# Patient Record
Sex: MALE | Race: WHITE | NOT HISPANIC OR LATINO | Employment: FULL TIME | URBAN - METROPOLITAN AREA
[De-identification: names, ages, dates, MRNs, and addresses within clinical notes are randomized per-mention and may not be internally consistent; named-entity substitution may affect disease eponyms.]

---

## 2023-11-08 ENCOUNTER — APPOINTMENT (OUTPATIENT)
Dept: RADIOLOGY | Facility: HOSPITAL | Age: 28
End: 2023-11-08
Payer: COMMERCIAL

## 2023-11-08 ENCOUNTER — HOSPITAL ENCOUNTER (EMERGENCY)
Facility: HOSPITAL | Age: 28
Discharge: HOME | End: 2023-11-08
Payer: COMMERCIAL

## 2023-11-08 VITALS
TEMPERATURE: 97.9 F | DIASTOLIC BLOOD PRESSURE: 68 MMHG | OXYGEN SATURATION: 100 % | RESPIRATION RATE: 16 BRPM | HEART RATE: 81 BPM | SYSTOLIC BLOOD PRESSURE: 120 MMHG

## 2023-11-08 DIAGNOSIS — S89.92XA INJURY OF LEFT KNEE, INITIAL ENCOUNTER: Primary | ICD-10-CM

## 2023-11-08 PROCEDURE — 73560 X-RAY EXAM OF KNEE 1 OR 2: CPT | Mod: LT

## 2023-11-08 PROCEDURE — 99285 EMERGENCY DEPT VISIT HI MDM: CPT | Mod: 25

## 2023-11-08 PROCEDURE — 73560 X-RAY EXAM OF KNEE 1 OR 2: CPT | Mod: LEFT SIDE | Performed by: RADIOLOGY

## 2023-11-08 ASSESSMENT — LIFESTYLE VARIABLES
HAVE PEOPLE ANNOYED YOU BY CRITICIZING YOUR DRINKING: NO
HAVE YOU EVER FELT YOU SHOULD CUT DOWN ON YOUR DRINKING: NO
REASON UNABLE TO ASSESS: NO
EVER HAD A DRINK FIRST THING IN THE MORNING TO STEADY YOUR NERVES TO GET RID OF A HANGOVER: NO
EVER FELT BAD OR GUILTY ABOUT YOUR DRINKING: NO

## 2023-11-08 NOTE — DISCHARGE INSTRUCTIONS
Follow-up with orthopedics due to your left knee injury.  You were placed in a knee immobilizer to stabilize her knee.  Elevate, ice, and rest.  Take ibuprofen for pain control.  Return to ER if symptoms change or worsen.

## 2023-11-08 NOTE — ED PROVIDER NOTES
Limitations to History: none  External Records Reviewed  Independent Historians: none  Social determinants affecting care: none    HPI  Josias Esteves is a 28 y.o. male who presents emergency department due to left knee injury.  He reports that he responded to a call and he had to lift patient off of the toilet and the patient landed directly on his left knee.  He is reporting left medial knee pain and swelling.  He denies loss of function of the knee, numbness, tingling, or any wounds.  He has not tried anything for pain quite yet.  He denies sustaining any other injuries.  He has no further complaints.    [facility]  Past Medical History:   Diagnosis Date    Personal history of other (healed) physical injury and trauma     History of sprain of ankle    Personal history of traumatic brain injury     History of concussion    Unspecified fracture of left foot, initial encounter for closed fracture     Fracture of left foot    reviewed by myself.    Meds  No current outpatient medications    Allergies  Not on File reviewed by myself.    SHx    reviewed by myself.      ------------------------------------------------------------------------------------------------------------------------------------------    There were no vitals taken for this visit.    Physical Exam  Vitals and nursing note reviewed.   Constitutional:       Appearance: Normal appearance. He is normal weight.   HENT:      Head: Normocephalic.      Nose: Nose normal.      Mouth/Throat:      Mouth: Mucous membranes are moist.      Pharynx: Oropharynx is clear.   Eyes:      Extraocular Movements: Extraocular movements intact.      Conjunctiva/sclera: Conjunctivae normal.   Cardiovascular:      Rate and Rhythm: Normal rate and regular rhythm.   Pulmonary:      Effort: Pulmonary effort is normal.      Breath sounds: Normal breath sounds.   Musculoskeletal:         General: Normal range of motion.      Cervical back: Neck supple.      Left hip: Normal. No tenderness  or bony tenderness. Normal range of motion.      Left knee: Swelling present. No erythema, ecchymosis, lacerations or bony tenderness. Normal range of motion. Tenderness present over the medial joint line. Normal pulse.      Left foot: Normal. Normal capillary refill. Normal pulse.   Skin:     General: Skin is warm and dry.   Neurological:      Mental Status: He is alert and oriented to person, place, and time.   Psychiatric:         Attention and Perception: Attention normal.         Mood and Affect: Mood normal.          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        Mild soft tissue stranding.        MACRO:   None        Signed by: Izzy Knott 11/8/2023 2:58 AM   Dictation workstation:   [id number]           Labs  Labs Reviewed - No data to display     ED Course  Diagnoses as of 11/08/23 0328   Injury of left knee, initial encounter        Medical Decision Making: He did not appear ill or toxic.  Vital signs reviewed and stable.  X-ray was obtained.    Differential diagnoses considered: Contusion, sprain, fracture, ligamentous injury, others    Medications given: Offered and declined    X-ray showing no acute fracture.  There is a bipartite patella with soft tissue stranding.  Discussed with the patient about the x-ray imaging.  He was placed in a knee immobilizer.  I recommend he follow-up with orthopedics due to the knee injury.  He was instructed to rest, ice, elevate his knee due to injury.  He is to take NSAIDs for pain control.  He is to return to ER symptoms change or worsen.  He verbalized understanding and agreed to plan of care.  He was discharged home in stable condition.    Diagnosis: Left knee injury  Plan: Discharge           Sathish Reddy PA-C  11/08/23 0328

## 2023-12-04 ENCOUNTER — EVALUATION (OUTPATIENT)
Dept: PHYSICAL THERAPY | Facility: CLINIC | Age: 28
End: 2023-12-04
Payer: COMMERCIAL

## 2023-12-04 DIAGNOSIS — S83.92XA SPRAIN OF UNSPECIFIED SITE OF LEFT KNEE, INITIAL ENCOUNTER: ICD-10-CM

## 2023-12-04 PROCEDURE — 97161 PT EVAL LOW COMPLEX 20 MIN: CPT | Mod: GP | Performed by: PHYSICAL THERAPIST

## 2023-12-04 PROCEDURE — 97110 THERAPEUTIC EXERCISES: CPT | Mod: GP | Performed by: PHYSICAL THERAPIST

## 2023-12-04 PROCEDURE — 97016 VASOPNEUMATIC DEVICE THERAPY: CPT | Mod: GP | Performed by: PHYSICAL THERAPIST

## 2023-12-04 ASSESSMENT — PATIENT HEALTH QUESTIONNAIRE - PHQ9
1. LITTLE INTEREST OR PLEASURE IN DOING THINGS: NOT AT ALL
2. FEELING DOWN, DEPRESSED OR HOPELESS: NOT AT ALL
SUM OF ALL RESPONSES TO PHQ9 QUESTIONS 1 AND 2: 0

## 2023-12-04 ASSESSMENT — COLUMBIA-SUICIDE SEVERITY RATING SCALE - C-SSRS
1. IN THE PAST MONTH, HAVE YOU WISHED YOU WERE DEAD OR WISHED YOU COULD GO TO SLEEP AND NOT WAKE UP?: NO
6. HAVE YOU EVER DONE ANYTHING, STARTED TO DO ANYTHING, OR PREPARED TO DO ANYTHING TO END YOUR LIFE?: NO
2. HAVE YOU ACTUALLY HAD ANY THOUGHTS OF KILLING YOURSELF?: NO

## 2023-12-04 NOTE — PROGRESS NOTES
Physical Therapy    Physical Therapy Evaluation    Patient Name: Josias Esteves  MRN: [id number]  Today's Date: 12/04/23  Time Calculation  Start Time: 0515  Stop Time: 0615  Time Calculation (min): 60 min     Insurance:  Visit number: 1 of 12  Insurance Type: Payor: [employer] / Plan: [employer] / Product Type: *No Product type* /   Authorization or Plan of Care date Range: LEFT KNEE SPRAIN - C9 12V 11/10-12/29/23 DIAG S83.92XA     Therapy diagnoses:   1. Sprain of unspecified site of left knee, initial encounter  Referral to Physical Therapy    Follow Up In Physical Therapy           General:  Reason for visit: Left knee injury 11/8/23.  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   Referred by: Clarence ACOSTA in [facility] MD appt:  12/5/23     Precautions:  none  Fall Risk: Low     Assessment:   Left knee injury 11/8/23.  Likely MCL strain.  Not feeling any improvement over past 4 weeks.    Impairments: Pain, Active ROM, Passive ROM, Strength, Activity limitations, Flexibility, Edema, Joint mobility, Decreased functional level, and Participation restrictions    Functional Limitations: Walking, Stair negotiation, Working, Sport, and Standing    Recommended Treatment:  Therapeutic exercise, Manual therapy, Gait training, Home program instruction and progression, Neuromuscular re-education, Therapeutic activities, Self care and home management, Instruction in activity modification, Electrical stimulation, and Vasopneumatic device + cold      Plan:  Plan of care was developed with input and agreement by the patient.  2 x 6    Rehab Potential: Excellent to achieve goals.    Goals:  Short Term Goals:   - Patient to be Indep in [facility] for self management of symptoms    - Patient to report abolished pain in the Left knee.    Long Term Goals:   - LEFS: 80/80 to indicate a significant improvement in overall function.      - Patient will demo 5/5  hip strength (all planes) to allow for correct gait and stair mechanics     - Patient will demo mild to no limitation AROM left knee to allow for correct mechanics with functional mobility.    - Patient to demonstrate gait with least restrictive device for all ADLS and does not demonstrate significant deviations that may contribute to discomfort in the future    - Patient to negotiate stairs reciprocally and descend with near equal eccentric control without use of hand rail.     - Return to full time duty as a       Subjective:  - CC:  Left knee injury.  Current functional limitations: knee josr on stairs, intermittent pain medial > lateral knee.  Occasional catching.  Reports swelling medial aspect at time of injury.  - ARISTEO:  Large man essentially fell on his Left leg.    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      Mild soft tissue stranding.  - PLOF: Indep and pain free.  - FUNCTIONAL LIMITATIONS: Walking with intermittent limp.   - WORK: Light duty.  [employer] .  - EXERCISE:Works out regularly.  Lighter weight, higher reps.     Medical History Form: Reviewed (scanned into chart)       Objective:   OBJECTIVE:    -GAIT: Independent. no device Mildly antalgic  -STAIRS:   Ascends and descends stairs Independently.    -SENSATION: intact    PALPATION:   Medial joint line R/L: negative / positive  :     -RANGE OF MOTION:  Knee ROM: full, pain with terminal flexion, and pain with terminal extension  Patellar Mobility WNL     STRENGTH    Manual Muscle Test Knee: Knee Flexion R/L: 5 / 5  Knee Extension R/L: 5 / 5      SPECIAL TESTS        Swelling(Sweep test): Trace  Melia R/L: negative /   "positive   Joint line Tendernes R/L: negative /  positive   Pain with overpressure into extension R/L: negative /  positive   Pain with overpressure into flexion R/L: negative /  positive   History of Catching/Locking at the knee R/L: negative /  positive   Valgus stress test at 30 flexion R/L: negative /  positive   Varus stress test at 30 flexion R/L: negative /  negative  Lachman test R/L: negative /  negative  Apprehension with medial or lateral patellar glide R/L: negative /  negative    Outcome Measures:  Other Measures  Lower Extremity Funtional Score (LEFS): 33     Treatment Performed: (\"NP\" = Not Performed)     Therapeutic Exercise:     25 minutes  Access Code: [email or web address]  Nustep x 5 minutes L3  - Supine Quadricep Sets   10 reps - 5 second hold hold  - Long Sitting Calf Stretch with Strap  3 reps - 30 seconds hold  - Seated Table Hamstring Stretch   3 reps - 30 seconds hold  - Standing Terminal Knee Extension with Resistance  - Black  30 reps - 3-5 second hold  - Single Leg Press  -  3 sets - 12-20 reps 50#  Consider Blood Flow restriction with LAQ    Manual Therapy:       minutes      Neuromuscular Re-education:      minutes      Gait Training:            minutes      Aquatics:            minutes      Therapeutic Activity:      minutes      Gait Training:            minutes      Modalities:       Vasopneumatic Device     15 minutes  To left knee.  Med compression   Electrical Stimulation          minutes  Ultrasound            minutes  Iontophoresis                     minutes  Cold Pack            minutes  Mechanical Traction           minutes    Self Care Home Management:    minutes    Canalith Reposition:          minutes     Education:          minutes    Other:       minutes      Evaluation Complexity: Low: 20 minutes; Moderate   minutes; Complex PT Evaluation Time Entry: 20;  minutes    Re-Evaluation:   minutes    "

## 2023-12-06 ENCOUNTER — TREATMENT (OUTPATIENT)
Dept: PHYSICAL THERAPY | Facility: CLINIC | Age: 28
End: 2023-12-06
Payer: COMMERCIAL

## 2023-12-06 DIAGNOSIS — S83.92XA SPRAIN OF UNSPECIFIED SITE OF LEFT KNEE, INITIAL ENCOUNTER: ICD-10-CM

## 2023-12-06 PROCEDURE — 97110 THERAPEUTIC EXERCISES: CPT | Mod: GP | Performed by: PHYSICAL THERAPIST

## 2023-12-06 PROCEDURE — 97016 VASOPNEUMATIC DEVICE THERAPY: CPT | Mod: GP | Performed by: PHYSICAL THERAPIST

## 2023-12-06 PROCEDURE — 97140 MANUAL THERAPY 1/> REGIONS: CPT | Mod: GP | Performed by: PHYSICAL THERAPIST

## 2023-12-06 NOTE — PROGRESS NOTES
PHYSICAL THERAPY TREATMENT NOTE    Patient Name:  Josias Esteves   Patient MRN: [id number]  Date: 12/06/23  Time Calculation  Start Time: 0200  Stop Time: 0300  Time Calculation (min): 60 min    Insurance:  Visit number: 2 of 12  Insurance Type: Payor: [employer] / Plan: [employer] / Product Type: *No Product type* /   Authorization or Plan of Care date Range:     Therapy diagnoses:   1. Sprain of unspecified site of left knee, initial encounter  Follow Up In Physical Therapy           General:  Reason for visit: Left knee injury 11/8/23.  Likely MCL sprain.  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   Referred by: Clarence ACOSTA in [facility] MD appt:  12/5/23      Precautions:  none  Fall Risk: Low    Goals:  Short Term Goals:   - Patient to be Indep in [facility] for self management of symptoms     - Patient to report abolished pain in the Left knee.     Long Term Goals:   - LEFS: 80/80 to indicate a significant improvement in overall function.       - Patient will demo 5/5 hip strength (all planes) to allow for correct gait and stair mechanics      - Patient will demo mild to no limitation AROM left knee to allow for correct mechanics with functional mobility.     - Patient to demonstrate gait with least restrictive device for all ADLS and does not demonstrate significant deviations that may contribute to discomfort in the future     - Patient to negotiate stairs reciprocally and descend with near equal eccentric control without use of hand rail.      - Return to full time duty as a     Assessment:  Progress towards functional goals: Reduced pain level.  Mild gait deviations this date, with minimal pain.  He did not have to work today.  With exercise pain up to 4/10 - location is medial aspect of the L  "knee.  Response to interventions: Excellent.  Justification for continued skilled care: To address remaining functional, objective and subjective deficits to allow them to return to full independence with ADLs.    Plan:  Continue with POC    Subjective:   Patient reports Knee is feeling well.  No increased pain after last PT session.  Progress towards functional goal: Progressing   Pain (0-10): 0 right now, but is having intermittent medial L knee pain.   HEP adherence / understanding: complete.    Objective:  No formal measurements taken this session.    Treatment Performed: (\"NP\" = Not Performed)     Therapeutic Exercise:     32 minutes  Access Code: [email or web address]  Nustep x 10 minutes L4  BFR with knee extension: 3#cuff weight,  pre -programmed strength setting.  Supine Quadricep Sets   10 reps - 5 second hold hold  Long Sitting Calf Stretch with Strap  3 reps - 30 seconds hold  Seated Table Hamstring Stretch   3 reps - 30 seconds hold  Standing Terminal Knee Extension with Resistance  - Black  30 reps - 3-5 second hold  Single Leg Press  -  3 sets - 12 reps 50#      Manual Therapy:     8 minutes  DTM to the MCL     Neuromuscular Re-education:      minutes      Gait Training:            minutes      Aquatics:            minutes      Therapeutic Activity:      minutes      Gait Training:            minutes      Modalities:       Vasopneumatic Device     10 minutes  VD + CP the L knee  Electrical Stimulation          minutes  Ultrasound            minutes  Iontophoresis                     minutes  Cold Pack            minutes  Mechanical Traction           minutes    Self Care Home Management:    minutes    Canalith Reposition:          minutes     Education:          minutes    Other:       minutes      Evaluation Complexity: Low:   minutes; Moderate   minutes; Complex   minutes    Re-Evaluation:   minutes           "

## 2023-12-15 ENCOUNTER — TREATMENT (OUTPATIENT)
Dept: PHYSICAL THERAPY | Facility: CLINIC | Age: 28
End: 2023-12-15
Payer: COMMERCIAL

## 2023-12-15 DIAGNOSIS — S83.92XA SPRAIN OF UNSPECIFIED SITE OF LEFT KNEE, INITIAL ENCOUNTER: ICD-10-CM

## 2023-12-15 PROCEDURE — 97110 THERAPEUTIC EXERCISES: CPT | Mod: GP | Performed by: PHYSICAL THERAPIST

## 2023-12-15 PROCEDURE — 97140 MANUAL THERAPY 1/> REGIONS: CPT | Mod: GP | Performed by: PHYSICAL THERAPIST

## 2023-12-15 NOTE — PROGRESS NOTES
PHYSICAL THERAPY TREATMENT NOTE    Patient Name:  Josias Esteves   Patient MRN: [id number]  Date: 12/15/23  Time Calculation  Start Time: 0200  Stop Time: 0245  Time Calculation (min): 45 min    Insurance:  Visit number: 2 of 12  Insurance Type: Payor: [employer] / Plan: [employer] / Product Type: *No Product type* /   Authorization or Plan of Care date Range:     Therapy diagnoses:   1. Sprain of unspecified site of left knee, initial encounter  Follow Up In Physical Therapy           General:  Reason for visit: Left knee injury 11/8/23.  Likely MCL sprain.  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   Referred by: Clarence ACOSTA in [facility] MD appt:  12/5/23      Precautions:  none  Fall Risk: Low    Goals:  Short Term Goals:   - Patient to be Indep in [facility] for self management of symptoms     - Patient to report abolished pain in the Left knee.     Long Term Goals:   - LEFS: 80/80 to indicate a significant improvement in overall function.       - Patient will demo 5/5 hip strength (all planes) to allow for correct gait and stair mechanics      - Patient will demo mild to no limitation AROM left knee to allow for correct mechanics with functional mobility.     - Patient to demonstrate gait with least restrictive device for all ADLS and does not demonstrate significant deviations that may contribute to discomfort in the future     - Patient to negotiate stairs reciprocally and descend with near equal eccentric control without use of hand rail.      - Return to full time duty as a     Assessment:  Progress towards functional goals: 1-2/10 pain at the medial aspect of the knee with exercise today.   Progressed PRE s as noted with good tolerance and no more than 2/10 pain.   This morning he noticed a brief  "episode of tingling in the L anterior shin, lasts only seconds.  He fist noticed it when he stepped out of the bed this morning and its occurred a few times during the day today.  I gave him sciatic n glides to work on, seems like he's having a little hypersensitivity at the peroneal n.  Without any true mechanism of injury.    Knee ROM is full and WNL with at most 1/10 pain.    Gait is WNL today.   Stairs are WNL  Response to interventions: Excellent.  Justification for continued skilled care: To address remaining functional, objective and subjective deficits to allow them to return to full independence with ADLs.    Plan:  Continue with POC    Subjective:   Patient reports Knee pain of 1/10   Pain (0-10): 0 right now, but is having intermittent medial L knee pain.   HEP adherence / understanding: complete.    Objective:  No formal measurements taken this session.    Treatment Performed: (\"NP\" = Not Performed)     Therapeutic Exercise:     35 minutes  Access Code: [email or web address]  Nustep x 10 minutes L4  Alternating fwd Lunge 3 x 10   Side step up and over x 030  Sumo squat: 30# 3 x 10   Leg Press: 80# 3 x 10   - **ACTIVITIES BELOW WERE NOT PERFORMED**   BFR with knee extension: 3#cuff weight,  pre -programmed strength setting.  Supine Quadricep Sets   10 reps - 5 second hold hold  Long Sitting Calf Stretch with Strap  3 reps - 30 seconds hold  Seated Table Hamstring Stretch   3 reps - 30 seconds hold  Standing Terminal Knee Extension with Resistance  - Black  30 reps - 3-5 second hold  Single Leg Press  -  3 sets - 12 reps 50#      Manual Therapy:     10 minutes  DTM to the [facility]     Neuromuscular Re-education:      minutes      Gait Training:            minutes      Aquatics:            minutes      Therapeutic Activity:      minutes      Gait Training:            minutes      Modalities:       Vasopneumatic Device       minutes  VD + CP the L knee  Electrical Stimulation          minutes  Ultrasound            " minutes  Iontophoresis                     minutes  Cold Pack            minutes  Mechanical Traction           minutes    Self Care Home Management:    minutes    Canalith Reposition:          minutes     Education:          minutes    Other:       minutes      Evaluation Complexity: Low:   minutes; Moderate   minutes; Complex   minutes    Re-Evaluation:   minutes

## 2023-12-18 ENCOUNTER — TREATMENT (OUTPATIENT)
Dept: PHYSICAL THERAPY | Facility: CLINIC | Age: 28
End: 2023-12-18
Payer: COMMERCIAL

## 2023-12-18 DIAGNOSIS — S83.92XA SPRAIN OF UNSPECIFIED SITE OF LEFT KNEE, INITIAL ENCOUNTER: ICD-10-CM

## 2023-12-18 PROCEDURE — 97140 MANUAL THERAPY 1/> REGIONS: CPT | Mod: GP | Performed by: PHYSICAL THERAPIST

## 2023-12-18 PROCEDURE — 97110 THERAPEUTIC EXERCISES: CPT | Mod: GP | Performed by: PHYSICAL THERAPIST

## 2023-12-18 NOTE — PROGRESS NOTES
PHYSICAL THERAPY TREATMENT NOTE    Patient Name:  Josias Esteves   Patient MRN: [id number]  Date: 12/18/23  Time Calculation  Start Time: 0335  Stop Time: 0432  Time Calculation (min): 57 min    Insurance:  Visit number: 4 of 12  Insurance Type: Payor: [employer] / Plan: [employer] / Product Type: *No Product type* /   Authorization or Plan of Care date Range: 12/4/23 - 12/3/24    Therapy diagnoses:   1. Sprain of unspecified site of left knee, initial encounter  Follow Up In Physical Therapy             General:  Reason for visit: Left knee injury 11/8/23.  Likely MCL sprain.  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   Referred by: Clarence ACOSTA in [facility] MD appt:  12/5/23      Precautions:  none  Fall Risk: Low    Goals:  Short Term Goals:   - Patient to be Indep in [facility] for self management of symptoms     - Patient to report abolished pain in the Left knee.     Long Term Goals:   - LEFS: 80/80 to indicate a significant improvement in overall function.       - Patient will demo 5/5 hip strength (all planes) to allow for correct gait and stair mechanics      - Patient will demo mild to no limitation AROM left knee to allow for correct mechanics with functional mobility.     - Patient to demonstrate gait with least restrictive device for all ADLS and does not demonstrate significant deviations that may contribute to discomfort in the future     - Patient to negotiate stairs reciprocally and descend with near equal eccentric control without use of hand rail.      - Return to full time duty as a     Assessment:  Progress towards functional goals: Improved ability to complete household activities. Improved ability to complete activities in the community. Reduced intensity of pain. No pain with daily  "activities.  No pain in mornings.   No pain with walking.    Response to interventions:  Progressed to lower level dynamic activities.  He tolerated these well but did have up to  3/10 medial knee pain with jogging.  So he continues to have mild pain when progressed to dynamic activities.  He does have mild point tenderness to palpation.  Justification for continued skilled care: To address remaining functional, objective and subjective deficits to allow them to return to full independence with ADLs.  Patient will follow up with MD this week.    Plan:  Exercises targeting surrounding musculature to stabilize the joint and improve function. Modalities as needed to address symptoms.  Progressive dynamic activities with in limits of pain.        Subjective:   Josias reports he feels like his condition is improving., No pain with daily activities.   He feels about 80% of prior level of function.  Progress towards functional goal:  Improved ability to complete household activities. Improved ability to complete activities in the community. Reduced pain level.   Pain (0-10): 0    HEP adherence / understanding: compliance with the instructed home exercises.    Objective:  No formal measurements taken this session.    Treatment Performed: (\"NP\" = Not Performed)     Therapeutic Exercise:     49 minutes  Access Code: [email or web address]  Upright bike:  5 minutes  Dynamic warmup x 5 minutes  TM W/J: 2:1 x 3   Side shuffle red band x 1 minute  Fwd step up with alternate hip hike  Sled Push Pull: 100# in sled x 3 laps  Resisted side stepping: 15# x 10 reps  Leg Press: 62# 3 x 10   **ACTIVITIES BELOW WERE NOT PERFORMED**   Alternating fwd Lunge 3 x 10   Side step up and over x 30  Sumo squat: 30# 3 x 10     - **ACTIVITIES BELOW WERE NOT PERFORMED**   BFR with knee extension: 3#cuff weight,  pre -programmed strength setting.  Supine Quadricep Sets   10 reps - 5 second hold hold  Long Sitting Calf Stretch with Strap  3 reps - 30 seconds " hold  Seated Table Hamstring Stretch   3 reps - 30 seconds hold  Standing Terminal Knee Extension with Resistance  - Black  30 reps - 3-5 second hold  Single Leg Press  -  3 sets - 12 reps 50#      Manual Therapy:     8 minutes  DTM to the [facility]     Neuromuscular Re-education:      minutes      Gait Training:            minutes      Aquatics:            minutes      Therapeutic Activity:      minutes      Gait Training:            minutes      Modalities:       Vasopneumatic Device       minutes  VD + CP the L knee  Electrical Stimulation          minutes  Ultrasound            minutes  Iontophoresis                     minutes  Cold Pack            minutes  Mechanical Traction           minutes    Self Care Home Management:    minutes    Canalith Reposition:          minutes     Education:          minutes    Other:       minutes      Evaluation Complexity: Low:   minutes; Moderate   minutes; Complex   minutes    Re-Evaluation:   minutes

## 2023-12-21 ENCOUNTER — TREATMENT (OUTPATIENT)
Dept: PHYSICAL THERAPY | Facility: CLINIC | Age: 28
End: 2023-12-21
Payer: COMMERCIAL

## 2023-12-21 DIAGNOSIS — S83.92XA SPRAIN OF UNSPECIFIED SITE OF LEFT KNEE, INITIAL ENCOUNTER: ICD-10-CM

## 2023-12-21 PROCEDURE — 97110 THERAPEUTIC EXERCISES: CPT | Mod: GP,CQ

## 2023-12-21 NOTE — PROGRESS NOTES
PHYSICAL THERAPY TREATMENT NOTE    Patient Name:  Josias Esteves   Patient MRN: [id number]  Date: 12/21/23  Time Calculation  Start Time: 0915  Stop Time: 0955  Time Calculation (min): 40 min    Insurance:  Visit number: 5 of 12  Insurance Type: Payor: [employer] / Plan: [employer] / Product Type: *No Product type* /   Authorization or Plan of Care date Range: 12/4/23 - 12/3/24    Therapy diagnoses:   1. Sprain of unspecified site of left knee, initial encounter  Follow Up In Physical Therapy             General:  Reason for visit: Left knee injury 11/8/23.  Likely MCL sprain.  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   Referred by: Clarence ACOSTA in [facility] the [facility] MD appt:  12/5/23      Precautions:  none  Fall Risk: Low    Goals:  Short Term Goals:   - Patient to be Indep in [facility] for self management of symptoms     - Patient to report abolished pain in the Left knee.     Long Term Goals:   - LEFS: 80/80 to indicate a significant improvement in overall function.       - Patient will demo 5/5 hip strength (all planes) to allow for correct gait and stair mechanics      - Patient will demo mild to no limitation AROM left knee to allow for correct mechanics with functional mobility.     - Patient to demonstrate gait with least restrictive device for all ADLS and does not demonstrate significant deviations that may contribute to discomfort in the future     - Patient to negotiate stairs reciprocally and descend with near equal eccentric control without use of hand rail.      - Return to full time duty as a     Assessment:  Progress towards functional goals: .  Painfree, able to tolerate higher level LLE stability exercises.  Response to interventions :Performed all exercises /activities with no increase in  "pain  Justification for continued skilled care: To address remaining functional, objective and subjective deficits to allow them to return to full independence with ADLs.  Patient will follow up with MD this week.    Plan:  Exercises targeting surrounding musculature to stabilize the joint and improve function. Modalities as needed to address symptoms.  Progressive dynamic activities with in limits of pain.    MD appt tomorrow to discuss RTW status    Subjective:   Josias reports he is feeling really well, close to full function. Able to run at the gym yesterday with no problems at 5.0 speed.   Progress towards functional goal:  Pain free. Improved tolerance for LLE stability exercises and ability to    Pain (0-10): 0    HEP adherence / understanding: Yes /doing well in the gym    Objective:  No formal measurements taken this session.    Treatment Performed: (\"NP\" = Not Performed)     Therapeutic Exercise:     40 minutes  Access Code: [email or web address]  Upright bike:  5 minutes  Dynamic warmup x 5 minutes  TM W/J: 2:1 x 3 np  Side shuffle red band x 1 minute np  BOSU Fwd step up with alternate hip hike  Sled Push Pull: 100# in sled x 3 laps np  Resisted forward /side stepping: 15# x 10 reps with 6 \" step at end /alternate hip pull up  Leg Press: 75# 3 x 10   BOSU squats 20x  BOSU lateral step overs with speed 2 x 20  Airex contralateral balance 3 ways 10x 2  LLE SLB 4# ball raises 2x 10 ball rotations 2 x 10   Dips with heel tap 4\" 2 x 10  Forward step down heel taps 4\" 2 x 10    **ACTIVITIES BELOW WERE NOT PERFORMED**   Alternating fwd Lunge 3 x 10   Side step up and over x 30  Sumo squat: 30# 3 x 10     - **ACTIVITIES BELOW WERE NOT PERFORMED**   BFR with knee extension: 3#cuff weight,  pre -programmed strength setting.  Supine Quadricep Sets   10 reps - 5 second hold hold  Long Sitting Calf Stretch with Strap  3 reps - 30 seconds hold  Seated Table Hamstring Stretch   3 reps - 30 seconds hold  Standing Terminal Knee " Extension with Resistance  - Black  30 reps - 3-5 second hold  Single Leg Press  -  3 sets - 12 reps 50#      Manual Therapy:       minutes  DTM to the MCL       Modalities:       Vasopneumatic Device       minutes  VD + CP the L knee  Electrical Stimulation          minutes  Ultrasound            minutes  Iontophoresis                     minutes  Cold Pack            minutes  Mechanical Traction           minutes

## 2023-12-26 ENCOUNTER — DOCUMENTATION (OUTPATIENT)
Dept: PHYSICAL THERAPY | Facility: CLINIC | Age: 28
End: 2023-12-26
Payer: COMMERCIAL

## 2023-12-26 NOTE — PROGRESS NOTES
Physical Therapy                 Therapy Communication Note    Patient Name: Josias Esteves  MRN: [id number]  Today's Date: 12/26/2023     Discipline: Physical Therapy    Missed Visit Reason:  ?    Missed Time: No Show    Comment:called pt. And LM  : NS and next scheduled appt

## 2023-12-28 ENCOUNTER — DOCUMENTATION (OUTPATIENT)
Dept: PHYSICAL THERAPY | Facility: CLINIC | Age: 28
End: 2023-12-28
Payer: COMMERCIAL

## 2023-12-28 NOTE — PROGRESS NOTES
Physical Therapy                 Therapy Communication Note    Patient Name: Josias Esteves  MRN: [id number]  Today's Date: 12/28/2023     Discipline: Physical Therapy    Missed Visit Reason:  ?    Missed Time: No Show    Comment: Per Radhames Hernandez PZORAN pt. will be discharged.

## 2024-03-12 ENCOUNTER — DOCUMENTATION (OUTPATIENT)
Dept: PHYSICAL THERAPY | Facility: CLINIC | Age: 29
End: 2024-03-12
Payer: COMMERCIAL